# Patient Record
Sex: MALE | Race: WHITE | NOT HISPANIC OR LATINO | Employment: OTHER | URBAN - METROPOLITAN AREA
[De-identification: names, ages, dates, MRNs, and addresses within clinical notes are randomized per-mention and may not be internally consistent; named-entity substitution may affect disease eponyms.]

---

## 2017-02-01 ENCOUNTER — OFFICE VISIT (OUTPATIENT)
Dept: ELECTROPHYSIOLOGY | Facility: CLINIC | Age: 62
End: 2017-02-01
Payer: COMMERCIAL

## 2017-02-01 ENCOUNTER — CLINICAL SUPPORT (OUTPATIENT)
Dept: ELECTROPHYSIOLOGY | Facility: CLINIC | Age: 62
End: 2017-02-01
Payer: COMMERCIAL

## 2017-02-01 ENCOUNTER — HOSPITAL ENCOUNTER (OUTPATIENT)
Dept: CARDIOLOGY | Facility: CLINIC | Age: 62
Discharge: HOME | End: 2017-02-01
Payer: COMMERCIAL

## 2017-02-01 VITALS
BODY MASS INDEX: 37.64 KG/M2 | DIASTOLIC BLOOD PRESSURE: 72 MMHG | WEIGHT: 284 LBS | SYSTOLIC BLOOD PRESSURE: 124 MMHG | HEIGHT: 73 IN | HEART RATE: 48 BPM

## 2017-02-01 DIAGNOSIS — I50.22 CHRONIC SYSTOLIC HEART FAILURE: Chronic | ICD-10-CM

## 2017-02-01 DIAGNOSIS — I25.5 ISCHEMIC CARDIOMYOPATHY: ICD-10-CM

## 2017-02-01 DIAGNOSIS — I49.01 VENTRICULAR FIBRILLATION: Primary | ICD-10-CM

## 2017-02-01 DIAGNOSIS — I46.9 CARDIAC ARREST: ICD-10-CM

## 2017-02-01 DIAGNOSIS — I10 ESSENTIAL HYPERTENSION: ICD-10-CM

## 2017-02-01 DIAGNOSIS — Z91.89 AT RISK FOR AMIODARONE TOXICITY WITH LONG TERM USE: ICD-10-CM

## 2017-02-01 DIAGNOSIS — A04.8 H. PYLORI INFECTION: Chronic | ICD-10-CM

## 2017-02-01 DIAGNOSIS — R57.0 CARDIOGENIC SHOCK: ICD-10-CM

## 2017-02-01 DIAGNOSIS — I21.3 ST ELEVATION MYOCARDIAL INFARCTION (STEMI), UNSPECIFIED ARTERY: ICD-10-CM

## 2017-02-01 DIAGNOSIS — Z95.810 ICD (IMPLANTABLE CARDIOVERTER-DEFIBRILLATOR) IN PLACE: ICD-10-CM

## 2017-02-01 DIAGNOSIS — I71.21 AORTIC ROOT ANEURYSM: ICD-10-CM

## 2017-02-01 DIAGNOSIS — E11.9 TYPE 2 DIABETES MELLITUS WITHOUT COMPLICATION, WITHOUT LONG-TERM CURRENT USE OF INSULIN: ICD-10-CM

## 2017-02-01 DIAGNOSIS — I49.01 VF (VENTRICULAR FIBRILLATION): ICD-10-CM

## 2017-02-01 DIAGNOSIS — Z79.899 AT RISK FOR AMIODARONE TOXICITY WITH LONG TERM USE: ICD-10-CM

## 2017-02-01 DIAGNOSIS — N17.9 ACUTE RENAL FAILURE, UNSPECIFIED ACUTE RENAL FAILURE TYPE: ICD-10-CM

## 2017-02-01 DIAGNOSIS — E66.9 NON MORBID OBESITY, UNSPECIFIED OBESITY TYPE: ICD-10-CM

## 2017-02-01 DIAGNOSIS — I49.01 VENTRICULAR FIBRILLATION: ICD-10-CM

## 2017-02-01 DIAGNOSIS — Z95.810 PRESENCE OF AUTOMATIC CARDIOVERTER/DEFIBRILLATOR (AICD): ICD-10-CM

## 2017-02-01 LAB
AORTIC VALVE REGURGITATION: ABNORMAL
DIASTOLIC DYSFUNCTION: YES
ESTIMATED PA SYSTOLIC PRESSURE: 36.64
MITRAL VALVE MOBILITY: NORMAL
MITRAL VALVE REGURGITATION: ABNORMAL
RETIRED EF AND QEF - SEE NOTES: 50 (ref 55–65)
TRICUSPID VALVE REGURGITATION: ABNORMAL

## 2017-02-01 PROCEDURE — 93283 PRGRMG EVAL IMPLANTABLE DFB: CPT | Mod: S$GLB,,, | Performed by: INTERNAL MEDICINE

## 2017-02-01 PROCEDURE — 93000 ELECTROCARDIOGRAM COMPLETE: CPT | Mod: S$GLB,,, | Performed by: INTERNAL MEDICINE

## 2017-02-01 PROCEDURE — 3078F DIAST BP <80 MM HG: CPT | Mod: S$GLB,,, | Performed by: INTERNAL MEDICINE

## 2017-02-01 PROCEDURE — 2022F DILAT RTA XM EVC RTNOPTHY: CPT | Mod: S$GLB,,, | Performed by: INTERNAL MEDICINE

## 2017-02-01 PROCEDURE — 4010F ACE/ARB THERAPY RXD/TAKEN: CPT | Mod: S$GLB,,, | Performed by: INTERNAL MEDICINE

## 2017-02-01 PROCEDURE — 93306 TTE W/DOPPLER COMPLETE: CPT | Mod: S$GLB,,, | Performed by: INTERNAL MEDICINE

## 2017-02-01 PROCEDURE — 99999 PR PBB SHADOW E&M-EST. PATIENT-LVL III: CPT | Mod: PBBFAC,,, | Performed by: INTERNAL MEDICINE

## 2017-02-01 PROCEDURE — 3046F HEMOGLOBIN A1C LEVEL >9.0%: CPT | Mod: S$GLB,,, | Performed by: INTERNAL MEDICINE

## 2017-02-01 PROCEDURE — 3074F SYST BP LT 130 MM HG: CPT | Mod: S$GLB,,, | Performed by: INTERNAL MEDICINE

## 2017-02-01 PROCEDURE — 99214 OFFICE O/P EST MOD 30 MIN: CPT | Mod: S$GLB,,, | Performed by: INTERNAL MEDICINE

## 2017-02-01 NOTE — PROGRESS NOTES
Subjective:   Patient ID:  Bahman Dennis is a 61 y.o. male     Chief complaint:Cardiomyopathy      HPI  From my last note (7/29/16):  60 man  Background:  History of ICM s/p cardiogenic shock at OSH requiring multiple coronary PCI's, IABP and ventilatory support, and recurrent VT/VF. His LVEF was found to be about 20%. He developed ATN then renal failure and was on HD for a while. He continued to have episodic VT. He was sent home with a LifeVest and on Amiodarone.      He underwent Dual chamber ICD implant with DFTs on 6/24/13. At his wound check, his atrial lead was not sensing or caturing and the lead impedance was >3000. There was also some oversensing in the RV lead. He went for atrial lead revision on 8/13/13 and found that the atrial set screw was loose.This was corrected. After fixing the A lead, the RV oversensing issues were unable to be reproduced with multiple manipulations (DEVIN, muscle contraction, pocket shaking etc. Since the RV lead was new and all its measurements appeared intact, it was surmised that somehow the dislocated A lead was acting as a source for intra header crosstalk.       He gets most of his care in [place].       Update:  He has been doing well  He went recently on an [employer] cruise and he did a lot of walking and did quite well  Reports doing well, NYHA I- II symptoms at this time. Lorsatan and Metoprolol, complaint. TTE LVEF 40-45%  Ascending aorta is 4.5 cm, previously 4.2-4.3 by echo, no CT noted in the system.   He says that he is essentially back to where he was prior to his heart attack  Interrogation today reveals stable lead and no episode of RV oversensing, RA pacing 2% no RV pacing, no VT/VF events noted.        Currently on ASA and Plavix >2 years post last PCI.      ECG today reveals 1 AVB @58 bpm. QRSd 108,   His last echo was in 11/2015:  1 - Mild left ventricular enlargement.     2 - Mildly to moderately depressed left ventricular systolic function (EF  40-45%).     3 - Normal right ventricular systolic function .     4 - Grade I left ventricular diastolic dysfunction.     5 - Moderately enlarged ascending aorta (45mm in diameter).     6 - Mild aortic regurgitation.     7 - The estimated PA systolic pressure is 31 mmHg.   Last amio tox screen -- PFTs OK in 11/2015  Lab work needs updating -- last 4/2015-- OK -- last amio levels were 1.5/1.0     ICD eval -- all OK -- no Vt etc, Circadian rates are in excellent range.   He has a CPAP that he uses 6 hrs a nite on average  Update since then:  I have reviewed the actual image of the ECG tracing obtained today and it shows NSR with normal intervals-- HR 48  ICD eval is all WNL -- low rate set at 40 -- A paces 7% -- no RV pacing, no VT , no AF, HR curves are physiologic  We reduced amio to 100 a day on his last visit and he has been doing well with that  He has no CV limitations per his admission  He can climb 2 flights   He walks on the treadmill several times a week and he also uses his wife's recumbent bike.   Echo today is pending  amio tox screen has been neg all along -- PFTs need updating.   Current Outpatient Prescriptions   Medication Sig    amiodarone (PACERONE) 100 MG Tab Take 1 tablet (100 mg total) by mouth once daily.    atorvastatin (LIPITOR) 80 MG tablet Take 1 tablet by mouth once daily.    clopidogrel (PLAVIX) 75 mg tablet Take 1 tablet by mouth once daily.    furosemide (LASIX) 80 MG tablet Take 40 mg by mouth once daily.     gabapentin (NEURONTIN) 300 MG capsule Take 300 mg by mouth 3 (three) times daily.    glimepiride (AMARYL) 2 MG tablet Take 2 mg by mouth 2 (two) times daily.    losartan (COZAAR) 50 MG tablet Take 1 tablet by mouth once daily.    meloxicam (MOBIC) 15 MG tablet     metoprolol succinate (TOPROL-XL) 50 MG 24 hr tablet Take 1 tablet by mouth once daily.    pioglitazone (ACTOS) 15 MG tablet Take 15 mg by mouth once daily.    ranitidine (ZANTAC) 150 MG capsule Take 150 mg by  mouth once daily.    zaleplon (SONATA) 5 MG Cap Take 1 capsule by mouth continuous prn.    aspirin 81 MG Chew Take 1 tablet (81 mg total) by mouth once daily.    nitroGLYCERIN (NITROSTAT) 0.4 MG SL tablet Place 1 tablet (0.4 mg total) under the tongue every 5 (five) minutes as needed for Chest pain.     Current Facility-Administered Medications   Medication    docusate sodium capsule 50 mg     Review of Systems   Constitution: Negative for decreased appetite, weakness, malaise/fatigue, weight gain and weight loss.   HENT: Negative for headaches.    Eyes: Negative for blurred vision.   Cardiovascular: Negative for chest pain, claudication, cyanosis, dyspnea on exertion, irregular heartbeat, leg swelling, near-syncope, orthopnea and palpitations.   Respiratory: Negative for cough, shortness of breath, sleep disturbances due to breathing, snoring and wheezing.    Endocrine: Negative for heat intolerance.   Hematologic/Lymphatic: Does not bruise/bleed easily.   Musculoskeletal: Negative for muscle weakness and myalgias.   Gastrointestinal: Negative for melena, nausea and vomiting.   Genitourinary: Negative for nocturia.   Neurological: Negative for excessive daytime sleepiness, dizziness and light-headedness.   Psychiatric/Behavioral: Negative for depression, memory loss and substance abuse. The patient does not have insomnia and is not nervous/anxious.        Objective:   Physical Exam   Constitutional: He is oriented to person, place, and time. He appears well-developed and well-nourished.   overweight   HENT:   Head: Normocephalic and atraumatic.   Right Ear: External ear normal.   Left Ear: External ear normal.   Eyes: Conjunctivae are normal. Pupils are equal, round, and reactive to light. Left conjunctiva is not injected. Left conjunctiva has no hemorrhage.   Neck: Neck supple. No JVD present. No thyromegaly present.   Cardiovascular: Normal rate, regular rhythm, normal heart sounds and intact distal pulses.   "PMI is not displaced.  Exam reveals no gallop, no friction rub, no midsystolic click and no opening snap.    No murmur heard.  Pulses:       Carotid pulses are 2+ on the right side, and 2+ on the left side.       Radial pulses are 2+ on the right side, and 2+ on the left side.        Dorsalis pedis pulses are 2+ on the right side, and 2+ on the left side.        Posterior tibial pulses are 2+ on the right side, and 2+ on the left side.   Pulmonary/Chest: Effort normal and breath sounds normal. No respiratory distress. He has no wheezes. He has no rales. He exhibits no tenderness.   Device pocket is in excellent repair     Abdominal: Soft. Normal appearance. He exhibits no pulsatile liver. There is no hepatomegaly. There is no tenderness. There is no rigidity and no guarding.   Obese abdomen   Musculoskeletal: Normal range of motion. He exhibits no edema or tenderness.        Right knee: He exhibits no swelling.        Left knee: He exhibits no swelling.        Right ankle: He exhibits no swelling.        Left ankle: He exhibits no swelling.        Right lower leg: He exhibits no swelling.        Left lower leg: He exhibits no swelling.        Right foot: There is no swelling.        Left foot: There is no swelling.   Neurological: He is alert and oriented to person, place, and time. He has normal strength and normal reflexes. No cranial nerve deficit. Coordination normal.   Skin: Skin is warm, dry and intact. No rash noted. No cyanosis. No pallor.   Psychiatric: He has a normal mood and affect. His behavior is normal.   Nursing note and vitals reviewed.    Visit Vitals    /72    Pulse (!) 48    Ht 6' 1" (1.854 m)    Wt 128.8 kg (284 lb)    BMI 37.47 kg/m2        Assessment:    He has been doing well   1. Ventricular fibrillation -- past Hx   2. ST elevation myocardial infarction (STEMI), unspecified artery -- past hx   3. Cardiac arrest    4. Ischemic cardiomyopathy -- EF mostly repaired   5. Cardiogenic " shock -- post MI - Hx of   6. Essential hypertension    7. Acute renal failure, unspecified acute renal failure type -- reversed -- now mild CKD   8. H. pylori infection    9. Aortic root aneurysm    10. Type 2 diabetes mellitus without complication, without long-term current use of insulin    11. Non morbid obesity, unspecified obesity type    12. ICD (implantable cardioverter-defibrillator) in place -- in good repair   13. At risk for amiodarone toxicity with long term use -- no apparent issues   14. Chronic systolic heart failure -- now class I       Plan:      Orders Placed This Encounter   Procedures    Brain natriuretic peptide     Standing Status:   Future     Standing Expiration Date:   4/2/2018    Amiodarone level     Standing Status:   Future     Standing Expiration Date:   4/2/2018    Hemoglobin     Standing Status:   Future     Standing Expiration Date:   4/2/2018    Complete PFT with bronchodilator     Standing Status:   Future     Standing Expiration Date:   2/1/2018     Return in about 6 months (around 8/1/2017).  There are no discontinued medications.  New Prescriptions    No medications on file     Modified Medications    No medications on file

## 2017-02-01 NOTE — MR AVS SNAPSHOT
[facility]  [address]  [facility] [address]  Phone: [phone]  Fax: [phone]                  Bahman Dennis   2017 2:20 PM   Office Visit    Description:  Male : 1955   Provider:  Art Beauchamp MD   Department:  [facility]           Reason for Visit     Cardiomyopathy           Diagnoses this Visit        Comments    Ventricular fibrillation    -  Primary     ST elevation myocardial infarction (STEMI), unspecified artery         Cardiac arrest         Ischemic cardiomyopathy         Cardiogenic shock         Essential hypertension         Acute renal failure, unspecified acute renal failure type         H. pylori infection         Aortic root aneurysm         Type 2 diabetes mellitus without complication, without long-term current use of insulin         Non morbid obesity, unspecified obesity type         ICD (implantable cardioverter-defibrillator) in place         At risk for amiodarone toxicity with long term use         Chronic systolic heart failure                To Do List           Future Appointments        Provider Department Dept Phone    4/3/2017 1:00 PM PULMONARY FUNCTION [facility] - Pulmonary Lab [phone]    4/3/2017 2:15 PM LAB, SAME DAY [facility] [phone]    2017 8:00 AM HOME MONITOR DEVICE CHECK, [facility] - Arrhythmia [phone]      Goals (5 Years of Data)     None      Follow-Up and Disposition     Return in about 6 months (around 2017).    Follow-up and Disposition History      [facility]     [facility] Care [facility] - / Assistance  Registered nurses in the [facility] provide clinical advisement, health education, appointment booking, and other advisory services.  Call for this free service at [phone].             Medications           Message regarding Medications     Verify the changes and/or additions to your medication regime listed below are the same as  "discussed with your clinician today.  If any of these changes or additions are incorrect, please notify your healthcare provider.             Verify that the below list of medications is an accurate representation of the medications you are currently taking.  If none reported, the list may be blank. If incorrect, please contact your healthcare provider. Carry this list with you in case of emergency.           Current Medications     amiodarone (PACERONE) 100 MG Tab Take 1 tablet (100 mg total) by mouth once daily.    atorvastatin (LIPITOR) 80 MG tablet Take 1 tablet by mouth once daily.    clopidogrel (PLAVIX) 75 mg tablet Take 1 tablet by mouth once daily.    furosemide (LASIX) 80 MG tablet Take 40 mg by mouth once daily.     gabapentin (NEURONTIN) 300 MG capsule Take 300 mg by mouth 3 (three) times daily.    glimepiride (AMARYL) 2 MG tablet Take 2 mg by mouth 2 (two) times daily.    losartan (COZAAR) 50 MG tablet Take 1 tablet by mouth once daily.    meloxicam (MOBIC) 15 MG tablet     metoprolol succinate (TOPROL-XL) 50 MG 24 hr tablet Take 1 tablet by mouth once daily.    pioglitazone (ACTOS) 15 MG tablet Take 15 mg by mouth once daily.    ranitidine (ZANTAC) 150 MG capsule Take 150 mg by mouth once daily.    zaleplon (SONATA) 5 MG Cap Take 1 capsule by mouth continuous prn.    aspirin 81 MG Chew Take 1 tablet (81 mg total) by mouth once daily.    nitroGLYCERIN (NITROSTAT) 0.4 MG SL tablet Place 1 tablet (0.4 mg total) under the tongue every 5 (five) minutes as needed for Chest pain.           Clinical Reference Information           Vital Signs - Last Recorded  Most recent update: 2/1/2017  3:00 PM by Juliette Louis MA    BP Pulse Ht Wt BMI    124/72 (!) 48 6' 1" (1.854 m) 128.8 kg (284 lb) 37.47 kg/m2      Blood Pressure          Most Recent Value    BP  124/72      Allergies as of 2/1/2017     Pcn [Penicillins]    Phenergan [Promethazine]    Demerol [Meperidine]      Immunizations Administered on Date of " Encounter - 2/1/2017     None      Orders Placed During Today's Visit     Future Labs/Procedures Expected by Expires    Amiodarone level  2/1/2017 4/2/2018    Brain natriuretic peptide  2/1/2017 4/2/2018    Hemoglobin  2/1/2017 4/2/2018    Complete PFT with bronchodilator  As directed 2/1/2018      Maintenance Dialysis History     Patient has no recorded history of maintenance dialysis.

## 2017-02-07 DIAGNOSIS — I49.01 VF (VENTRICULAR FIBRILLATION): Primary | ICD-10-CM

## 2017-02-13 ENCOUNTER — TELEPHONE (OUTPATIENT)
Dept: ELECTROPHYSIOLOGY | Facility: CLINIC | Age: 62
End: 2017-02-13

## 2017-02-13 DIAGNOSIS — I25.5 ISCHEMIC CARDIOMYOPATHY: Primary | ICD-10-CM

## 2017-02-13 NOTE — TELEPHONE ENCOUNTER
----- Message from Art Beauchamp MD sent at 2/1/2017  6:32 PM CST -----  Please see comments below and call patient.  In general you do not need to route back to me.  LVEF almost back to normal  Mild AI   Ascending aorta stable -- same as 3 years ago  Repeat in 1 year  Keep same meds for now

## 2017-05-01 ENCOUNTER — CLINICAL SUPPORT (OUTPATIENT)
Dept: ELECTROPHYSIOLOGY | Facility: CLINIC | Age: 62
End: 2017-05-01
Payer: COMMERCIAL

## 2017-05-01 DIAGNOSIS — Z95.810 ICD (IMPLANTABLE CARDIOVERTER-DEFIBRILLATOR) IN PLACE: ICD-10-CM

## 2017-05-01 DIAGNOSIS — I49.01 VENTRICULAR FIBRILLATION: ICD-10-CM

## 2017-05-01 PROCEDURE — 93296 REM INTERROG EVL PM/IDS: CPT | Mod: S$GLB,,, | Performed by: INTERNAL MEDICINE

## 2017-05-01 PROCEDURE — 93295 DEV INTERROG REMOTE 1/2/MLT: CPT | Mod: S$GLB,,, | Performed by: INTERNAL MEDICINE

## 2017-05-02 ENCOUNTER — TELEPHONE (OUTPATIENT)
Dept: ELECTROPHYSIOLOGY | Facility: CLINIC | Age: 62
End: 2017-05-02

## 2017-06-28 ENCOUNTER — PATIENT MESSAGE (OUTPATIENT)
Dept: ELECTROPHYSIOLOGY | Facility: CLINIC | Age: 62
End: 2017-06-28

## 2017-06-29 ENCOUNTER — TELEPHONE (OUTPATIENT)
Dept: ELECTROPHYSIOLOGY | Facility: CLINIC | Age: 62
End: 2017-06-29

## 2017-06-29 NOTE — TELEPHONE ENCOUNTER
I spoke with Mr. Dennis and he would like to move his in clinic ICD check on the same date that he is due to see Dr. Durand. I informed him that Dr. Lo schedule is not opened for October as of now. I told Mr. Dennis that I will have Dr. Lo medical assistant take care of this and call him when she has an appointment date and time for him. Patient stated understanding. I will keep his in clinic ICD check appointment as is until Lian is able to schedule his appointments.

## 2017-08-17 ENCOUNTER — OFFICE VISIT (OUTPATIENT)
Dept: ELECTROPHYSIOLOGY | Facility: CLINIC | Age: 62
End: 2017-08-17
Payer: COMMERCIAL

## 2017-08-17 ENCOUNTER — HOSPITAL ENCOUNTER (OUTPATIENT)
Dept: CARDIOLOGY | Facility: CLINIC | Age: 62
Discharge: HOME | End: 2017-08-17
Payer: COMMERCIAL

## 2017-08-17 ENCOUNTER — CLINICAL SUPPORT (OUTPATIENT)
Dept: ELECTROPHYSIOLOGY | Facility: CLINIC | Age: 62
End: 2017-08-17
Payer: COMMERCIAL

## 2017-08-17 VITALS
BODY MASS INDEX: 38.8 KG/M2 | HEART RATE: 53 BPM | DIASTOLIC BLOOD PRESSURE: 69 MMHG | WEIGHT: 292.75 LBS | SYSTOLIC BLOOD PRESSURE: 120 MMHG | HEIGHT: 73 IN

## 2017-08-17 DIAGNOSIS — I46.9 CARDIAC ARREST: ICD-10-CM

## 2017-08-17 DIAGNOSIS — E78.5 HYPERLIPIDEMIA, UNSPECIFIED HYPERLIPIDEMIA TYPE: ICD-10-CM

## 2017-08-17 DIAGNOSIS — I49.01 VENTRICULAR FIBRILLATION: Primary | ICD-10-CM

## 2017-08-17 DIAGNOSIS — Z91.89 AT RISK FOR AMIODARONE TOXICITY WITH LONG TERM USE: ICD-10-CM

## 2017-08-17 DIAGNOSIS — I49.01 VF (VENTRICULAR FIBRILLATION): ICD-10-CM

## 2017-08-17 DIAGNOSIS — E11.9 TYPE 2 DIABETES MELLITUS WITHOUT COMPLICATION, WITHOUT LONG-TERM CURRENT USE OF INSULIN: ICD-10-CM

## 2017-08-17 DIAGNOSIS — I21.3 ST ELEVATION MYOCARDIAL INFARCTION (STEMI), UNSPECIFIED ARTERY: ICD-10-CM

## 2017-08-17 DIAGNOSIS — R57.0 CARDIOGENIC SHOCK: ICD-10-CM

## 2017-08-17 DIAGNOSIS — I71.21 AORTIC ROOT ANEURYSM: ICD-10-CM

## 2017-08-17 DIAGNOSIS — I49.01 VENTRICULAR FIBRILLATION: ICD-10-CM

## 2017-08-17 DIAGNOSIS — G47.30 SLEEP APNEA, UNSPECIFIED TYPE: ICD-10-CM

## 2017-08-17 DIAGNOSIS — Z95.810 PRESENCE OF AUTOMATIC CARDIOVERTER/DEFIBRILLATOR (AICD): ICD-10-CM

## 2017-08-17 DIAGNOSIS — I45.3 TRIFASCICULAR BLOCK: ICD-10-CM

## 2017-08-17 DIAGNOSIS — Z79.899 AT RISK FOR AMIODARONE TOXICITY WITH LONG TERM USE: ICD-10-CM

## 2017-08-17 DIAGNOSIS — Z98.61 S/P PTCA (PERCUTANEOUS TRANSLUMINAL CORONARY ANGIOPLASTY): ICD-10-CM

## 2017-08-17 DIAGNOSIS — I25.5 ISCHEMIC CARDIOMYOPATHY: ICD-10-CM

## 2017-08-17 DIAGNOSIS — Z95.810 ICD (IMPLANTABLE CARDIOVERTER-DEFIBRILLATOR) IN PLACE: ICD-10-CM

## 2017-08-17 DIAGNOSIS — I10 ESSENTIAL HYPERTENSION: ICD-10-CM

## 2017-08-17 PROCEDURE — 93283 PRGRMG EVAL IMPLANTABLE DFB: CPT | Mod: S$GLB,,, | Performed by: INTERNAL MEDICINE

## 2017-08-17 PROCEDURE — 99999 PR PBB SHADOW E&M-EST. PATIENT-LVL III: CPT | Mod: PBBFAC,,, | Performed by: INTERNAL MEDICINE

## 2017-08-17 PROCEDURE — 93000 ELECTROCARDIOGRAM COMPLETE: CPT | Mod: S$GLB,,, | Performed by: INTERNAL MEDICINE

## 2017-08-17 PROCEDURE — 99024 POSTOP FOLLOW-UP VISIT: CPT | Mod: S$GLB,,, | Performed by: INTERNAL MEDICINE

## 2017-08-17 NOTE — PROGRESS NOTES
Subjective:   Patient ID:  Bahman Dennis is a 62 y.o. male     Chief complaint:Tachycardia      HPI  62 man  Background:  History of ICM s/p cardiogenic shock at OSH requiring multiple coronary PCI's, IABP and ventilatory support, and recurrent VT/VF. His LVEF was found to be about 20%. He developed ATN then renal failure and was on HD for a while. He continued to have episodic VT. He was sent home with a LifeVest and on Amiodarone.      He underwent Dual chamber ICD implant with DFTs on 6/24/13. At his wound check, his atrial lead was not sensing or caturing and the lead impedance was >3000. There was also some oversensing in the RV lead. He went for atrial lead revision on 8/13/13 and found that the atrial set screw was loose.This was corrected. After fixing the A lead, the RV oversensing issues were unable to be reproduced with multiple manipulations (DEVIN, muscle contraction, pocket shaking etc. Since the RV lead was new and all its measurements appeared intact, it was surmised that somehow the dislocated A lead was acting as a source for intra header crosstalk.       He gets most of his care in [place].       Update till 7/29/16:  He has been doing well  He went recently on an [employer] cruise and he did a lot of walking and did quite well  Reports doing well, NYHA I- II symptoms at this time. Lorsatan and Metoprolol, complaint. TTE LVEF 40-45%  Ascending aorta is 4.5 cm, previously 4.2-4.3 by echo, no CT noted in the system.   He says that he is essentially back to where he was prior to his heart attack  Interrogation today reveals stable lead and no episode of RV oversensing, RA pacing 2% no RV pacing, no VT/VF events noted.        Currently on ASA and Plavix >2 years post last PCI.      ECG today reveals 1 AVB @58 bpm. QRSd 108,   His last echo was in 11/2015:    1 - Mild left ventricular enlargement.     2 - Mildly to moderately depressed left ventricular systolic function (EF 40-45%).     3 - Normal  right ventricular systolic function .     4 - Grade I left ventricular diastolic dysfunction.     5 - Moderately enlarged ascending aorta (45mm in diameter).     6 - Mild aortic regurgitation.     7 - The estimated PA systolic pressure is 31 mmHg.   Last amio tox screen -- PFTs OK in 11/2015  Lab work needs updating -- last 4/2015-- OK -- last amio levels were 1.5/1.0     ICD eval -- all OK -- no Vt etc, Circadian rates are in excellent range.   He has a CPAP that he uses 6 hrs a nite on average    Update 7/29/16 till 2/1/17:  I have reviewed the actual image of the ECG tracing obtained today and it shows NSR with normal intervals-- HR 48  ICD eval is all WNL -- low rate set at 40 -- A paces 7% -- no RV pacing, no VT , no AF, HR curves are physiologic  We reduced amio to 100 a day on his last visit and he has been doing well with that  He has no CV limitations per his admission  He can climb 2 flights   He walks on the treadmill several times a week and he also uses his wife's recumbent bike.   Echo today is pending  amio tox screen has been neg all along -- PFTs need updating.     Update since then:  Mr Dennis came in earlier and had his ICD evaluation done. This showed intact battery and lead function as well as only 4% RA pacing and no RV pacing. There were no AF nor VT episodes noted.  Mr Dennis seemed to be in his usual state of health to the pacemaker clinic and the clinic staff.   He had an ECG earlier and I have reviewed the actual image of said ECG tracing: it shows SB 52 bpm, RBBB, QRSd 160, LAD -33 deg, FDAVB 250 msec, old AS MI.  Mr Dennis did not stay for the rest of his visit with me -- we were behind on our schedule-- I called him later and understood from him that he was doing quite well without GUEVARA,CP, syncope, palps or other CV sx.He does use a recumbent bike for 10 min at a time w/o issues. He continues to use his CPAP effectively.       Current Outpatient Prescriptions   Medication Sig    amiodarone  (PACERONE) 100 MG Tab Take 1 tablet (100 mg total) by mouth once daily.    aspirin 81 MG Chew Take 1 tablet (81 mg total) by mouth once daily.    atorvastatin (LIPITOR) 80 MG tablet Take 1 tablet by mouth once daily.    clopidogrel (PLAVIX) 75 mg tablet Take 1 tablet by mouth once daily.    furosemide (LASIX) 80 MG tablet Take 40 mg by mouth once daily.     gabapentin (NEURONTIN) 300 MG capsule Take 300 mg by mouth 3 (three) times daily.    glimepiride (AMARYL) 2 MG tablet Take 2 mg by mouth 2 (two) times daily.    losartan (COZAAR) 50 MG tablet Take 1 tablet by mouth once daily.    metoprolol succinate (TOPROL-XL) 50 MG 24 hr tablet Take 1 tablet by mouth once daily.    pioglitazone (ACTOS) 15 MG tablet Take 15 mg by mouth once daily.    ranitidine (ZANTAC) 150 MG capsule Take 150 mg by mouth once daily.    SITagliptin (JANUVIA) 100 MG Tab Take 100 mg by mouth.    zaleplon (SONATA) 5 MG Cap Take 1 capsule by mouth continuous prn.    nitroGLYCERIN (NITROSTAT) 0.4 MG SL tablet Place 1 tablet (0.4 mg total) under the tongue every 5 (five) minutes as needed for Chest pain.     No current facility-administered medications for this visit.      Review of Systems   Constitution: Negative for decreased appetite, weakness, malaise/fatigue, weight gain and weight loss.   HENT: Negative for headaches.    Eyes: Negative for blurred vision.   Cardiovascular: Negative for chest pain, claudication, cyanosis, dyspnea on exertion, irregular heartbeat, leg swelling, near-syncope, orthopnea and palpitations.   Respiratory: Negative for cough, shortness of breath, sleep disturbances due to breathing, snoring and wheezing.    Endocrine: Negative for heat intolerance.   Hematologic/Lymphatic: Does not bruise/bleed easily.   Musculoskeletal: Negative for muscle weakness and myalgias.   Gastrointestinal: Negative for melena, nausea and vomiting.   Genitourinary: Negative for nocturia.   Neurological: Negative for excessive  "daytime sleepiness, dizziness and light-headedness.   Psychiatric/Behavioral: Negative for depression, memory loss and substance abuse. The patient does not have insomnia and is not nervous/anxious.        Objective:   Physical Exam  /69   Pulse (!) 53   Ht 6' 1" (1.854 m)   Wt 132.8 kg (292 lb 12.3 oz)   BMI 38.63 kg/m²   Per ICD clinic report he appeared to be WNWD, in NAD with an obese abdomen and an intact ICD pocket in the L pre-pec area  Assessment:      1. Ventricular fibrillation - as a result, of MI with aborted SCD     2. Hx of ST elevation myocardial infarction (STEMI), unspecified artery -- reversed by PCI - still has ECG evidence. EF has however gradually improved to 50%--    3. Hx of Cardiogenic shock-- as a result of 2 above-- resolved   4. Type 2 diabetes mellitus without complication, without long-term current use of insulin    5. S/P PTCA (percutaneous transluminal coronary angioplasty)    6. Sleep apnea, unspecified type    7. Essential hypertension -- seems to be well controlled judjng by clinic BPs   8. Hx of cardiac arrest -- see above   9. Hyperlipidemia, unspecified hyperlipidemia type    10. ICD (implantable cardioverter-defibrillator) in place-- had low EF for an extended period post MI    11. Ischemic cardiomyopathy -- improved EF   12. Trifascicular block -- FDAVB,RBBB,LAD   13. At risk for amiodarone toxicity with long term use -- amio tox screen is out of date and needs update (was neg at last screen- labs are a year old, PFTs 2 years old)   14. Aortic root aneurysm -- last echo sizing in feb 4.6 at sinus of valsalva, 4.0 at ST jct       Plan:    I called Mr Dennis and updated his Hx over the phone-- see above.  We agreed to schedule required follow up items to include TSH,CMP,amio level, PFT/DLCO,lipid profile and 2D/D. These will be obtained in 3 months on the date of his RTC to see me-- all results to be available to me for review.  Orders Placed This Encounter   Procedures "    TSH     Standing Status:   Future     Standing Expiration Date:   8/19/2018    AMIODARONE & METABOLITE     Standing Status:   Future     Standing Expiration Date:   10/18/2018    Comprehensive metabolic panel     Standing Status:   Future     Standing Expiration Date:   10/18/2018    Lipid panel     Standing Status:   Future     Standing Expiration Date:   10/18/2018    Hemoglobin     Standing Status:   Future     Standing Expiration Date:   10/18/2018    CK     Standing Status:   Future     Standing Expiration Date:   10/18/2018    2D echo with color flow doppler     Standing Status:   Future     Standing Expiration Date:   8/19/2018    Complete PFT with bronchodilator     Standing Status:   Future     Standing Expiration Date:   8/19/2018     Return in about 3 months (around 11/17/2017), or if symptoms worsen or fail to improve.  Medications Discontinued During This Encounter   Medication Reason    docusate sodium capsule 50 mg Patient no longer taking    meloxicam (MOBIC) 15 MG tablet Patient no longer taking     New Prescriptions    No medications on file     Modified Medications    No medications on file

## 2017-08-17 NOTE — Clinical Note
See my note for scheduling RTC and labs on same day in 3 months I entered all the orders but did not date them Tx

## 2017-08-19 PROBLEM — I45.3 TRIFASCICULAR BLOCK: Status: ACTIVE | Noted: 2017-08-19

## 2017-11-20 ENCOUNTER — CLINICAL SUPPORT (OUTPATIENT)
Dept: ELECTROPHYSIOLOGY | Facility: CLINIC | Age: 62
End: 2017-11-20
Payer: COMMERCIAL

## 2017-11-20 DIAGNOSIS — R57.0 CARDIOGENIC SHOCK: ICD-10-CM

## 2017-11-20 DIAGNOSIS — I49.01 VENTRICULAR FIBRILLATION: ICD-10-CM

## 2017-11-20 DIAGNOSIS — I49.01 VF (VENTRICULAR FIBRILLATION): ICD-10-CM

## 2017-11-20 DIAGNOSIS — Z95.810 ICD (IMPLANTABLE CARDIOVERTER-DEFIBRILLATOR) IN PLACE: ICD-10-CM

## 2017-11-20 DIAGNOSIS — I46.9 CARDIAC ARREST: ICD-10-CM

## 2017-11-20 DIAGNOSIS — Z95.810 AICD (AUTOMATIC CARDIOVERTER/DEFIBRILLATOR) PRESENT: Primary | ICD-10-CM

## 2017-11-20 PROCEDURE — 93295 DEV INTERROG REMOTE 1/2/MLT: CPT | Mod: S$GLB,,, | Performed by: INTERNAL MEDICINE

## 2017-11-20 PROCEDURE — 93296 REM INTERROG EVL PM/IDS: CPT | Mod: S$GLB,,, | Performed by: INTERNAL MEDICINE

## 2018-02-20 ENCOUNTER — CLINICAL SUPPORT (OUTPATIENT)
Dept: ELECTROPHYSIOLOGY | Facility: CLINIC | Age: 63
End: 2018-02-20
Attending: INTERNAL MEDICINE
Payer: COMMERCIAL

## 2018-02-20 DIAGNOSIS — R57.0 CARDIOGENIC SHOCK: ICD-10-CM

## 2018-02-20 DIAGNOSIS — I49.01 VF (VENTRICULAR FIBRILLATION): ICD-10-CM

## 2018-02-20 DIAGNOSIS — I46.9 CARDIAC ARREST: ICD-10-CM

## 2018-02-20 DIAGNOSIS — Z95.810 AICD (AUTOMATIC CARDIOVERTER/DEFIBRILLATOR) PRESENT: ICD-10-CM

## 2018-02-20 PROCEDURE — 93295 DEV INTERROG REMOTE 1/2/MLT: CPT | Mod: S$GLB,,, | Performed by: INTERNAL MEDICINE

## 2018-02-20 PROCEDURE — 93296 REM INTERROG EVL PM/IDS: CPT | Mod: S$GLB,,, | Performed by: INTERNAL MEDICINE

## 2018-05-22 ENCOUNTER — CLINICAL SUPPORT (OUTPATIENT)
Dept: ELECTROPHYSIOLOGY | Facility: CLINIC | Age: 63
End: 2018-05-22
Attending: INTERNAL MEDICINE
Payer: COMMERCIAL

## 2018-05-22 DIAGNOSIS — R57.0 CARDIOGENIC SHOCK: ICD-10-CM

## 2018-05-22 DIAGNOSIS — I49.01 VF (VENTRICULAR FIBRILLATION): ICD-10-CM

## 2018-05-22 DIAGNOSIS — I25.5 ISCHEMIC CARDIOMYOPATHY: ICD-10-CM

## 2018-05-22 DIAGNOSIS — I46.9 CARDIAC ARREST: ICD-10-CM

## 2018-05-22 DIAGNOSIS — I49.01 VENTRICULAR FIBRILLATION: ICD-10-CM

## 2018-05-22 DIAGNOSIS — Z95.810 AICD (AUTOMATIC CARDIOVERTER/DEFIBRILLATOR) PRESENT: Primary | ICD-10-CM

## 2018-05-22 DIAGNOSIS — Z95.810 AICD (AUTOMATIC CARDIOVERTER/DEFIBRILLATOR) PRESENT: ICD-10-CM

## 2018-05-22 PROCEDURE — 93295 DEV INTERROG REMOTE 1/2/MLT: CPT | Mod: S$GLB,,, | Performed by: INTERNAL MEDICINE

## 2018-05-22 PROCEDURE — 93296 REM INTERROG EVL PM/IDS: CPT | Mod: S$GLB,,, | Performed by: INTERNAL MEDICINE

## 2019-02-07 DIAGNOSIS — Z95.810 CARDIAC DEFIBRILLATOR IN PLACE: Primary | ICD-10-CM

## 2019-02-26 ENCOUNTER — TELEPHONE (OUTPATIENT)
Dept: CARDIOLOGY | Facility: HOSPITAL | Age: 64
End: 2019-02-26

## 2019-02-26 NOTE — TELEPHONE ENCOUNTER
Patient was due for his remote ICD transmission on 02/25/2019. I have not received this transmission yet. I called patient to have him send. No answer on home or cell phone. I left a voice message for him to connect to his monitor. I also asked that he call me with any questions or problems. I left my direct number.

## 2019-03-13 ENCOUNTER — TELEPHONE (OUTPATIENT)
Dept: ELECTROPHYSIOLOGY | Facility: CLINIC | Age: 64
End: 2019-03-13